# Patient Record
Sex: MALE | Race: WHITE | NOT HISPANIC OR LATINO
[De-identification: names, ages, dates, MRNs, and addresses within clinical notes are randomized per-mention and may not be internally consistent; named-entity substitution may affect disease eponyms.]

---

## 2020-04-02 ENCOUNTER — TRANSCRIPTION ENCOUNTER (OUTPATIENT)
Age: 55
End: 2020-04-02

## 2020-07-05 ENCOUNTER — OUTPATIENT (OUTPATIENT)
Dept: OUTPATIENT SERVICES | Facility: HOSPITAL | Age: 55
LOS: 1 days | Discharge: HOME | End: 2020-07-05

## 2020-07-05 DIAGNOSIS — Z11.59 ENCOUNTER FOR SCREENING FOR OTHER VIRAL DISEASES: ICD-10-CM

## 2022-03-09 ENCOUNTER — TRANSCRIPTION ENCOUNTER (OUTPATIENT)
Age: 57
End: 2022-03-09

## 2022-03-09 ENCOUNTER — APPOINTMENT (OUTPATIENT)
Age: 57
End: 2022-03-09
Payer: COMMERCIAL

## 2022-03-09 PROBLEM — Z00.00 ENCOUNTER FOR PREVENTIVE HEALTH EXAMINATION: Status: ACTIVE | Noted: 2022-03-09

## 2022-03-09 PROCEDURE — 99213 OFFICE O/P EST LOW 20 MIN: CPT | Mod: 95

## 2022-03-09 NOTE — HISTORY OF PRESENT ILLNESS
[Home] : at home, [unfilled] , at the time of the visit. [Medical Office: ([facility])___] : at the medical office located in  [Verbal consent obtained from patient] : the patient, [unfilled] [Initial Evaluation] : an initial evaluation of [Excessive Daytime Sleepiness] : excessive daytime sleepiness [Witnessed Apnea During Sleep] : witnessed apnea during sleep [Witnessed Gasping During Sleep] : witnessed gasping during sleep [Snoring] : snoring [Unrefreshing Sleep] : unrefreshing sleep [Sleepy When Sedentary] : sleepy when sedentary [Irritability] : irritability [Currently Experiencing] : The patient is currently experiencing symptoms. [Dry Throat] : dry throat [None] : The patient is not currently being treated for this problem [Obesity] : obesity

## 2022-03-14 ENCOUNTER — OUTPATIENT (OUTPATIENT)
Dept: OUTPATIENT SERVICES | Facility: HOSPITAL | Age: 57
LOS: 1 days | Discharge: HOME | End: 2022-03-14
Payer: COMMERCIAL

## 2022-03-14 PROCEDURE — 95810 POLYSOM 6/> YRS 4/> PARAM: CPT | Mod: 26

## 2022-03-15 DIAGNOSIS — G47.33 OBSTRUCTIVE SLEEP APNEA (ADULT) (PEDIATRIC): ICD-10-CM

## 2022-07-13 ENCOUNTER — APPOINTMENT (OUTPATIENT)
Age: 57
End: 2022-07-13

## 2022-11-23 ENCOUNTER — NON-APPOINTMENT (OUTPATIENT)
Age: 57
End: 2022-11-23

## 2022-11-25 ENCOUNTER — APPOINTMENT (OUTPATIENT)
Age: 57
End: 2022-11-25

## 2022-11-25 VITALS
DIASTOLIC BLOOD PRESSURE: 70 MMHG | SYSTOLIC BLOOD PRESSURE: 120 MMHG | WEIGHT: 220 LBS | HEART RATE: 52 BPM | HEIGHT: 69 IN | BODY MASS INDEX: 32.58 KG/M2 | OXYGEN SATURATION: 97 %

## 2022-11-25 DIAGNOSIS — G47.33 OBSTRUCTIVE SLEEP APNEA (ADULT) (PEDIATRIC): ICD-10-CM

## 2022-11-25 PROCEDURE — 99213 OFFICE O/P EST LOW 20 MIN: CPT

## 2023-08-13 ENCOUNTER — EMERGENCY (EMERGENCY)
Facility: HOSPITAL | Age: 58
LOS: 0 days | Discharge: HOME | End: 2023-08-14
Attending: EMERGENCY MEDICINE
Payer: COMMERCIAL

## 2023-08-13 VITALS
HEIGHT: 69 IN | DIASTOLIC BLOOD PRESSURE: 92 MMHG | TEMPERATURE: 98 F | WEIGHT: 220.02 LBS | SYSTOLIC BLOOD PRESSURE: 139 MMHG | OXYGEN SATURATION: 99 % | RESPIRATION RATE: 18 BRPM | HEART RATE: 62 BPM

## 2023-08-13 DIAGNOSIS — Z88.0 ALLERGY STATUS TO PENICILLIN: ICD-10-CM

## 2023-08-13 DIAGNOSIS — M79.651 PAIN IN RIGHT THIGH: ICD-10-CM

## 2023-08-13 DIAGNOSIS — R05.8 OTHER SPECIFIED COUGH: ICD-10-CM

## 2023-08-13 PROCEDURE — 93970 EXTREMITY STUDY: CPT

## 2023-08-13 PROCEDURE — 71046 X-RAY EXAM CHEST 2 VIEWS: CPT | Mod: 26

## 2023-08-13 PROCEDURE — 99284 EMERGENCY DEPT VISIT MOD MDM: CPT | Mod: 25

## 2023-08-13 PROCEDURE — 99284 EMERGENCY DEPT VISIT MOD MDM: CPT

## 2023-08-13 PROCEDURE — 71046 X-RAY EXAM CHEST 2 VIEWS: CPT

## 2023-08-13 PROCEDURE — 93970 EXTREMITY STUDY: CPT | Mod: 26

## 2023-08-13 RX ORDER — TIZANIDINE 4 MG/1
1 TABLET ORAL
Qty: 40 | Refills: 0
Start: 2023-08-13 | End: 2023-08-22

## 2023-08-13 RX ORDER — ACETAMINOPHEN 500 MG
650 TABLET ORAL ONCE
Refills: 0 | Status: COMPLETED | OUTPATIENT
Start: 2023-08-13 | End: 2023-08-13

## 2023-08-13 RX ORDER — IBUPROFEN 200 MG
1 TABLET ORAL
Qty: 40 | Refills: 0
Start: 2023-08-13 | End: 2023-08-22

## 2023-08-13 RX ADMIN — Medication 650 MILLIGRAM(S): at 22:37

## 2023-08-13 NOTE — ED ADULT NURSE NOTE - NSFALLUNIVINTERV_ED_ALL_ED
Bed/Stretcher in lowest position, wheels locked, appropriate side rails in place/Call bell, personal items and telephone in reach/Instruct patient to call for assistance before getting out of bed/chair/stretcher/Non-slip footwear applied when patient is off stretcher/[place] to call system/Physically safe environment - no spills, clutter or unnecessary equipment/Purposeful proactive rounding/Room/bathroom lighting operational, light cord in reach

## 2023-08-13 NOTE — ED PROVIDER NOTE - CLINICAL SUMMARY MEDICAL DECISION MAKING FREE TEXT BOX
Right leg pain appears to be MSK in nature.  But just returned from long flight to [place].  Workup in ED ok.  Clinically appears to be a psoas strain.  Medictions plus outpatient PT.  Stable for discharge.

## 2023-08-13 NOTE — ED PROVIDER NOTE - OBJECTIVE STATEMENT
58 year old M denies pmhx presenting to er with rt upper leg pain. Pt sts has had rt thigh pain radiating to groin x last few days. Notes recently returned from flight from [place]. sts pain is worse with movement and better with rest. Pt also c/o mild dry cough. He denies any known trauma/injuries, calf pain/swelling, hx of dvt/pe, chest pain, sob, skin changes/redness/warmth, back pain, abd pain, nausea, vomiting.

## 2023-08-13 NOTE — ED ADULT NURSE NOTE - OBJECTIVE STATEMENT
Pt c/o RT leg pain radiating upward towards the groin. Pt requesting an ultrasound to r/o a blood clot. Pt denies swelling or PMH of DVT

## 2023-08-13 NOTE — ED PROVIDER NOTE - PATIENT PORTAL LINK FT
You can access the FollowMyHealth Patient Portal offered by [facility] by registering at the following website: [email or web address]. By joining [facility]’s FollowMyHealth portal, you will also be able to view your health information using other applications (apps) compatible with our system.

## 2023-08-13 NOTE — ED ADULT TRIAGE NOTE - CHIEF COMPLAINT QUOTE
I went to [place] last week, got back yesterday. On Tuesday My right leg started hurting from the knee up to the groin. [facility] [place]. They said I need an ultrasound to see if I have blood clots - patient   Patient denies chest pain or SOB, denies leg swelling, denies history of blood clots. [facility]  and tool Flexeril earlier in the day

## 2023-08-13 NOTE — ED PROVIDER NOTE - NSFOLLOWUPCLINICS_GEN_ALL_ED_FT
[facility] ([facility])  Rehabilitation  [facility] [address] [place] [address]  Phone: [phone]  Fax:
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
## 2023-12-18 ENCOUNTER — NON-APPOINTMENT (OUTPATIENT)
Age: 58
End: 2023-12-18

## 2023-12-19 ENCOUNTER — APPOINTMENT (OUTPATIENT)
Dept: ORTHOPEDIC SURGERY | Facility: CLINIC | Age: 58
End: 2023-12-19
Payer: OTHER GOVERNMENT

## 2023-12-19 PROCEDURE — 99203 OFFICE O/P NEW LOW 30 MIN: CPT | Mod: ACP

## 2023-12-19 PROCEDURE — 73030 X-RAY EXAM OF SHOULDER: CPT | Mod: RT

## 2023-12-19 RX ORDER — MELOXICAM 15 MG/1
15 TABLET ORAL
Qty: 30 | Refills: 1 | Status: ACTIVE | COMMUNITY
Start: 2023-12-19 | End: 1900-01-01
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
## 2023-12-19 NOTE — PHYSICAL EXAM
[de-identified] : Physical exam of the right shoulder: -No erythema, edema, ecchymosis or acute deformities.  Skin intact -TTP over scapula and lateral aspect of GH joint -Patient has limited range of motion in forward flexion and internal rotation with discomfort -Decreased strength of forward flexion and external rotation -+2 radial pulse, sensation intact to light touch
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
## 2024-01-04 ENCOUNTER — APPOINTMENT (OUTPATIENT)
Dept: ORTHOPEDIC SURGERY | Facility: CLINIC | Age: 59
End: 2024-01-04
Payer: OTHER GOVERNMENT

## 2024-01-04 PROCEDURE — 20611 DRAIN/INJ JOINT/BURSA W/US: CPT | Mod: RT

## 2024-01-04 PROCEDURE — 99204 OFFICE O/P NEW MOD 45 MIN: CPT | Mod: 25

## 2024-01-15 NOTE — HISTORY OF PRESENT ILLNESS
[de-identified] : Patient is here for evaluation of right shoulder pain Affecting quality of life Wakes up at night due to pain  NAD Right shoulder: TTP ant GH joint, bicipital groove FF 0-175 ER 60 IR T12 5/5 strength scapular abduction, ER, IR Pos Impingement Pos Moser Pos Cross Arm Adduction Negative instability  XRay right shoulder negative for fracture, dislocation, arthritis  mri right shoulder: bursitis  plan went over findings explained the mri and tx pt due to pain, right shoulder injection fu in 2 months  Large Joint Injection was performed because of pain/rom. Anesthesia: ethyl chloride sprayed topically. Dexamethasone 2 cc of 4mg.   Lidocaine: 2 cc of 1%  Medication was injected in the right shoulder. Patient has tried OTC's including aspirin, Ibuprofen, Aleve etc or prescription NSAIDS, and/or exercises at home and/ or physical therapy without satisfactory response. After verbal consent using sterile preparation and technique. The risks, benefits, and alternatives to cortisone injection were explained in full to the patient. Risks outlined include but are not limited to infection, sepsis, bleeding, scarring, skin discoloration, temporary increase in pain, syncopal episode, failure to resolve symptoms, allergic reaction, symptom recurrence, and elevation of blood sugar in diabetics. Patient understood the risks. All questions were answered. After discussion of options, patient requested an injection. Oral informed consent was obtained and sterile prep was done of the injection site. Sterile technique was utilized for the procedure including the preparation of the solutions used for the injection. Patient tolerated the procedure well. Advised to ice the injection site this evening. Prep with alcohol locally to site. Sterile technique used. Diagnostic ultrasound was performed of the shoulder to confirm.

## 2024-02-20 ENCOUNTER — APPOINTMENT (OUTPATIENT)
Dept: ORTHOPEDIC SURGERY | Facility: CLINIC | Age: 59
End: 2024-02-20
Payer: OTHER GOVERNMENT

## 2024-02-20 DIAGNOSIS — S49.91XA UNSPECIFIED INJURY OF RIGHT SHOULDER AND UPPER ARM, INITIAL ENCOUNTER: ICD-10-CM

## 2024-02-20 PROCEDURE — 99213 OFFICE O/P EST LOW 20 MIN: CPT

## 2024-02-20 NOTE — HISTORY OF PRESENT ILLNESS
[de-identified] : Patient is here for evaluation of right shoulder pain  Pain to shoulder has been getting better  NAD Right shoulder: TTP ant GH joint, bicipital groove FF 0-175 ER 60 IR T12 5/5 strength scapular abduction, ER, IR Pos Impingement Pos Moser Pos Cross Arm Adduction Negative instability  XRay right shoulder negative for fracture, dislocation, arthritis  mri right shoulder: bursitis  plan went over findings explained the mri and tx since doing better will cont cons tx pt, hep pain control if symptoms worsen, fu in office